# Patient Record
Sex: FEMALE | Race: BLACK OR AFRICAN AMERICAN | NOT HISPANIC OR LATINO | Employment: OTHER | URBAN - METROPOLITAN AREA
[De-identification: names, ages, dates, MRNs, and addresses within clinical notes are randomized per-mention and may not be internally consistent; named-entity substitution may affect disease eponyms.]

---

## 2017-03-16 ENCOUNTER — HOSPITAL ENCOUNTER (EMERGENCY)
Facility: HOSPITAL | Age: 62
Discharge: HOME | End: 2017-03-16
Attending: EMERGENCY MEDICINE
Payer: MEDICAID

## 2017-03-16 VITALS
TEMPERATURE: 97 F | SYSTOLIC BLOOD PRESSURE: 131 MMHG | RESPIRATION RATE: 18 BRPM | DIASTOLIC BLOOD PRESSURE: 63 MMHG | HEIGHT: 60 IN | BODY MASS INDEX: 24.35 KG/M2 | HEART RATE: 88 BPM | WEIGHT: 124 LBS | OXYGEN SATURATION: 95 %

## 2017-03-16 DIAGNOSIS — S09.90XA HEAD INJURY, INITIAL ENCOUNTER: Primary | ICD-10-CM

## 2017-03-16 DIAGNOSIS — S30.0XXA SACRAL CONTUSION, INITIAL ENCOUNTER: ICD-10-CM
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
## 2017-03-16 PROCEDURE — 99284 EMERGENCY DEPT VISIT MOD MDM: CPT

## 2017-03-16 NOTE — ED NOTES
Pt states no further needs/concerns. resp even, unlabored. No distress noted. Pt AAOx3. Will d/c per MD order.

## 2017-03-16 NOTE — ED AVS SNAPSHOT
[facility]  [address] [address]               Palmira Ornelas   3/16/2017  2:12 AM   ED    Description:  Female : 1955   Department:  [facility]           Your Care was Coordinated By:     Provider Role From To    Patel Lopez MD Attending Provider 17 [phone]      Reason for Visit     Fall           Diagnoses this Visit        Comments    Head injury, initial encounter    -  Primary     Fall         Sacral contusion, initial encounter           ED Disposition     ED Disposition Condition Comment    Discharge  Home           To Do List           Follow-up Information     Follow up with Dayton Perez MD. Schedule an appointment as soon as possible for a visit on 3/20/2017.    Specialty:  Family Medicine    Contact information:    [address] [place] [address] DRIVE  [facility] [address] [phone]        [facility]     [facility] -  Assistance  Registered nurses in the [facility] provide clinical advisement, health education, appointment booking, and other advisory services.  Call for this free service at [phone].             Medications                Verify that the below list of medications is an accurate representation of the medications you are currently taking.  If none reported, the list may be blank. If incorrect, please contact your healthcare provider. Carry this list with you in case of emergency.           Current Medications     NON FORMULARY MEDICATION     NON FORMULARY MEDICATION            Clinical Reference Information           Your Vitals Were     BP Pulse Temp Resp Height Weight    148/74 (BP Location: Left arm, Patient Position: Sitting) 95 98.1 °F (36.7 °C) (Oral) 20 5' (1.524 m) 56.2 kg (124 lb)    SpO2 BMI             96% 24.22 kg/m2         Allergies as of 3/16/2017        Reactions    Vibramycin [Doxycycline Calcium] Rash      Immunizations  Administered on Date of Encounter - 3/16/2017     None      ED Micro, Lab, POCT     None      ED Imaging Orders     Start Ordered       Status Ordering Provider    03/16/17 0228 03/16/17 0228  CT Head Without Contrast  1 time imaging      In process     03/16/17 0228 03/16/17 0228  X-Ray Sacrum And Coccyx  1 time imaging      In process       Discharge References/Attachments     COCCYX OR SACRUM CONTUSION (ENGLISH)    HEAD INJURIES: FIRST AID (ENGLISH)      [facility] Sign-Up     Activating your [facility] account is as easy as 1-2-3!     1) Visit [email or web address], select Sign Up Now, enter this activation code and your date of birth, then select Next.  [email or web address]  Expires: 4/30/2017  3:58 AM      2) Create a username and password to use when you visit [facility] in the future and select a security question in case you lose your password and select Next.    3) Enter your e-mail address and click Sign Up!    Additional Information  If you have questions, please e-mail [email or web address] or call [phone] to talk to our [facility] staff. Remember, [facility] is NOT to be used for urgent needs. For medical emergencies, dial 911.          [facility] complies with applicable Federal civil rights laws and does not discriminate on the basis of race, color, national origin, age, disability, or sex.        Language Assistance Services     ATTENTION: Language assistance services are available, free of charge. Please call [phone].      ATENCIÓN: Si habla español, tiene a grullon disposición servicios gratuitos de asistencia lingüística. Llame al [phone].     CHÚ Ý: N?u b?n nói Ti?ng Vi?t, có các d?ch v? h? tr? ngôn ng? mi?n phí dành cho b?n. G?i s? [phone].

## 2017-03-16 NOTE — ED NOTES
Pt presents to ED for trip and fall that occurred PTA. Pt states she tripped on a rug at home and hit the back of her head on a cabinet and landed on her buttocks. Pt denies LOC before or after fall. No open wound noted to posterior head. PT currently AAOx3. Pt ambulates with no assistance needed. Active ROM noted to all extremities.

## 2017-03-16 NOTE — ED PROVIDER NOTES
"Encounter Date: 3/16/2017       History     Chief Complaint   Patient presents with    Fall     Pt states, "I tripped and fell in the kitchen and hit my head and tailbone" Denies LOC, N/V, or confusion.      Review of patient's allergies indicates:   Allergen Reactions    Vibramycin [doxycycline calcium] Rash     HPI Comments: Patient currently presents with concern regarding ground-level fall.  Patient reports that she struck her head firmly on the cabinet before ultimately landing on her tailbone.  She describes pain in both sites.  She denies loss of consciousness or AMS.  She has not noted any swelling or laceration.  Patient does take Plavix chronically.  She remains ambulatory.    The history is provided by the patient.     History reviewed. No pertinent past medical history.  History reviewed. No pertinent surgical history.  History reviewed. No pertinent family history.  Social History   Substance Use Topics    Smoking status: Current Every Day Smoker     Packs/day: 2.00     Types: Cigarettes    Smokeless tobacco: None    Alcohol use No     Review of Systems   Constitutional: Negative for chills and fever.   HENT: Negative for congestion and rhinorrhea.    Respiratory: Negative for cough, chest tightness, shortness of breath and wheezing.    Cardiovascular: Negative for chest pain, palpitations and leg swelling.   Gastrointestinal: Negative for abdominal pain, constipation, diarrhea, nausea and vomiting.   Genitourinary: Negative for dysuria, frequency, urgency, vaginal bleeding and vaginal discharge.   Skin: Negative for color change and rash.   Allergic/Immunologic: Negative for immunocompromised state.   Neurological: Negative for dizziness, speech difficulty, weakness and numbness.   Hematological: Negative for adenopathy. Does not bruise/bleed easily.   All other systems reviewed and are negative.      Physical Exam   Initial Vitals   BP Pulse Resp Temp SpO2   03/16/17 0210 03/16/17 0210 03/16/17 " 0210 03/16/17 0210 03/16/17 0210   148/74 95 20 98.1 °F (36.7 °C) 96 %     Physical Exam    Nursing note and vitals reviewed.  Constitutional: She appears well-developed and well-nourished. She is not diaphoretic. No distress.   HENT:   Head: Normocephalic and atraumatic.       Right Ear: External ear normal.   Left Ear: External ear normal.   Nose: Nose normal.   Mouth/Throat: Oropharynx is clear and moist.   Eyes: Conjunctivae and EOM are normal. Pupils are equal, round, and reactive to light.   Neck: Neck supple. No JVD present.   Cardiovascular: Normal rate, regular rhythm, normal heart sounds and intact distal pulses. Exam reveals no gallop and no friction rub.    No murmur heard.  Pulmonary/Chest: Breath sounds normal. No respiratory distress. She has no wheezes. She has no rhonchi. She has no rales.   Abdominal: Soft. Bowel sounds are normal. She exhibits no distension. There is no tenderness.   Musculoskeletal: Normal range of motion.        Lumbar back: She exhibits tenderness and bony tenderness. She exhibits normal range of motion, no swelling, no deformity and no laceration.        Back:    Neurological: She is alert and oriented to person, place, and time. She has normal strength. No cranial nerve deficit or sensory deficit. GCS eye subscore is 4. GCS verbal subscore is 5. GCS motor subscore is 6.   Skin: Skin is warm and dry. No rash noted.   Psychiatric: She has a normal mood and affect. Her behavior is normal.         ED Course   Procedures  Labs Reviewed - No data to display          Medical Decision Making:   Initial Assessment:   All historical, clinical, and radiographic findings were reviewed with the patient in detail.  No significant evidence of soft tissue or intracranial injury.  All remaining questions and concerns were addressed at that time.  Patient has been counseled regarding the need for follow-up as well as the indication to return to the emergency room should new or worrisome  developments occur.  Patel Lopez MD                     ED Course     Clinical Impression:   The primary encounter diagnosis was Head injury, initial encounter. Diagnoses of Fall and Sacral contusion, initial encounter were also pertinent to this visit.          Patel Lopez MD  03/16/17 0357       Patel Lopez MD  03/16/17 0408

## 2017-11-20 ENCOUNTER — HOSPITAL ENCOUNTER (EMERGENCY)
Facility: HOSPITAL | Age: 62
Discharge: HOME | End: 2017-11-20
Attending: EMERGENCY MEDICINE
Payer: MEDICARE

## 2017-11-20 VITALS
TEMPERATURE: 98 F | BODY MASS INDEX: 21.48 KG/M2 | HEART RATE: 83 BPM | OXYGEN SATURATION: 94 % | DIASTOLIC BLOOD PRESSURE: 57 MMHG | WEIGHT: 110 LBS | SYSTOLIC BLOOD PRESSURE: 112 MMHG | RESPIRATION RATE: 20 BRPM

## 2017-11-20 DIAGNOSIS — L03.115 CELLULITIS OF RIGHT ANTERIOR LOWER LEG: Primary | ICD-10-CM

## 2017-11-20 PROCEDURE — 25000003 PHARM REV CODE 250: Performed by: EMERGENCY MEDICINE

## 2017-11-20 PROCEDURE — 99283 EMERGENCY DEPT VISIT LOW MDM: CPT

## 2017-11-20 RX ORDER — MUPIROCIN 20 MG/G
OINTMENT TOPICAL
Status: COMPLETED | OUTPATIENT
Start: 2017-11-20 | End: 2017-11-20

## 2017-11-20 RX ORDER — MUPIROCIN 20 MG/G
OINTMENT TOPICAL 3 TIMES DAILY
Qty: 30 G | Refills: 1 | Status: SHIPPED | OUTPATIENT
Start: 2017-11-20 | End: 2017-11-25

## 2017-11-20 RX ADMIN — MUPIROCIN: 20 OINTMENT TOPICAL at 02:11

## 2017-11-20 NOTE — ED PROVIDER NOTES
"Encounter Date: 11/20/2017       History     Chief Complaint   Patient presents with    Rash     Pt states  "I noticed a rash around 1 this morning".     Here with a neighbor for concern about a rash.  She has been on various antibiotics for the last few weeks for pneumonia and bronchitis.  She continues to smoke.  Recently on home oxygen for COPD.  Having a little trouble with her home oxygen equipment but no shortness of breath, fever, or other acute respiratory complaints.  She was worried that she might have a rash at about 1:00 this morning, saying that she felt a little itchy all over.  She has an area on the right anterior mid shin that she contused several months ago and has had some degree of scar since, and she feels that it is now a little more red than previously.  She will plan to see her primary care provider in the next one or 2 days.  No fever, chest pain, or other complaints.      The history is provided by the patient. No  was used.     Review of patient's allergies indicates:   Allergen Reactions    Benadryl [diphenhydramine hcl]     Vibramycin [doxycycline calcium] Rash     Past Medical History:   Diagnosis Date    Hypertension      History reviewed. No pertinent surgical history.  History reviewed. No pertinent family history.  Social History   Substance Use Topics    Smoking status: Current Every Day Smoker     Packs/day: 2.00     Types: Cigarettes    Smokeless tobacco: Not on file    Alcohol use No     Review of Systems   Constitutional: Negative for activity change, fatigue and fever.   HENT: Negative for congestion, ear pain, facial swelling, nosebleeds, sinus pressure and sore throat.    Eyes: Negative for pain, discharge, redness and visual disturbance.   Respiratory: Positive for cough. Negative for choking, chest tightness, shortness of breath and wheezing.    Cardiovascular: Negative for chest pain, palpitations and leg swelling.   Gastrointestinal: Negative " for abdominal distention, abdominal pain, nausea and vomiting.   Endocrine: Negative for heat intolerance, polydipsia and polyuria.   Genitourinary: Negative for difficulty urinating, dysuria, flank pain, hematuria and urgency.   Musculoskeletal: Negative for back pain, gait problem, joint swelling and myalgias.   Skin: Positive for rash. Negative for color change.   Allergic/Immunologic: Negative for environmental allergies and food allergies.   Neurological: Negative for dizziness, weakness, numbness and headaches.   Hematological: Negative for adenopathy. Does not bruise/bleed easily.   Psychiatric/Behavioral: Negative for agitation and behavioral problems. The patient is not nervous/anxious.    All other systems reviewed and are negative.      Physical Exam     Initial Vitals [11/20/17 0153]   BP Pulse Resp Temp SpO2   (!) 112/57 83 20 97.5 °F (36.4 °C) (!) 94 %      MAP       75.33         Physical Exam    Nursing note and vitals reviewed.  Constitutional: She appears well-developed and well-nourished. She is not diaphoretic. No distress.   Anxious, mildly chronically ill-appearing, NAD   HENT:   Head: Normocephalic and atraumatic.   Mouth/Throat: No oropharyngeal exudate.   Eyes: Conjunctivae and EOM are normal. Pupils are equal, round, and reactive to light. Right eye exhibits no discharge. Left eye exhibits no discharge. No scleral icterus.   Neck: Normal range of motion. Neck supple. No thyromegaly present. No tracheal deviation present. No JVD present.   Cardiovascular: Normal rate, regular rhythm, normal heart sounds and intact distal pulses. Exam reveals no gallop and no friction rub.    No murmur heard.  Pulmonary/Chest: No stridor. No respiratory distress. She has no wheezes. She has rhonchi. She has no rales. She exhibits no tenderness.   Mild coarse diffuse rhonchi   Abdominal: Soft. Bowel sounds are normal. She exhibits no distension and no mass. There is no tenderness. There is no rebound and no  guarding.   Musculoskeletal: Normal range of motion. She exhibits edema. She exhibits no tenderness.   Chronic-appearing symmetric BLE pitting edema to mid-calf   Neurological: She is alert and oriented to person, place, and time. She has normal strength.   Skin: Skin is warm and dry. No rash and no abscess noted. No erythema.   No definite rash or lesion.  Skin surface areas are reviewed in detail, no convincing sign of ALLERGIC reaction or other acute pathology.  The area in question on the right lower anterior shin appears to be a chronically healed contused or scarred area, questionably affected with some mild early erythema.  Cannot rule out an early cellulitis.  No abscess or lymphangitis.  No other acute findings.   Psychiatric: Her behavior is normal. Judgment and thought content normal.   Anxious but no acute findings and no grounds for commitment or further investigation         ED Course   Procedures  Labs Reviewed - No data to display                            ED Course      Clinical Impression:     1. Cellulitis of right anterior lower leg          Disposition:   Disposition: Discharged  Condition: Stable                        Lior Rosado MD  11/20/17 0215

## 2019-02-07 ENCOUNTER — TELEPHONE (OUTPATIENT)
Dept: HEMATOLOGY/ONCOLOGY | Facility: CLINIC | Age: 64
End: 2019-02-07

## 2019-02-07 NOTE — TELEPHONE ENCOUNTER
Received referral from Dr. Perez for pt to see thoracic surgery.  Several attempts made to contact pt and unable to leave message [phone].

## 2019-02-11 ENCOUNTER — TELEPHONE (OUTPATIENT)
Dept: HEMATOLOGY/ONCOLOGY | Facility: CLINIC | Age: 64
End: 2019-02-11

## 2019-02-11 NOTE — TELEPHONE ENCOUNTER
Many attempts made to contact pt.  Dialed emergency contact listed [phone] (Ihsan Edmond) who states pt is currently hospitalized at [facility].  No other numbers available.